# Patient Record
Sex: FEMALE | Race: WHITE | Employment: FULL TIME | URBAN - METROPOLITAN AREA
[De-identification: names, ages, dates, MRNs, and addresses within clinical notes are randomized per-mention and may not be internally consistent; named-entity substitution may affect disease eponyms.]

---

## 2017-02-03 ENCOUNTER — OFFICE VISIT (OUTPATIENT)
Dept: INTERNAL MEDICINE CLINIC | Age: 57
End: 2017-02-03

## 2017-02-03 VITALS
TEMPERATURE: 98.2 F | BODY MASS INDEX: 41.07 KG/M2 | WEIGHT: 209.2 LBS | DIASTOLIC BLOOD PRESSURE: 77 MMHG | HEART RATE: 74 BPM | RESPIRATION RATE: 16 BRPM | HEIGHT: 60 IN | SYSTOLIC BLOOD PRESSURE: 118 MMHG

## 2017-02-03 DIAGNOSIS — J45.909 UNCOMPLICATED ASTHMA, UNSPECIFIED ASTHMA SEVERITY: ICD-10-CM

## 2017-02-03 DIAGNOSIS — Z83.3 FAMILY HISTORY OF DIABETES MELLITUS: ICD-10-CM

## 2017-02-03 DIAGNOSIS — E55.9 VITAMIN D DEFICIENCY: ICD-10-CM

## 2017-02-03 DIAGNOSIS — J30.9 ALLERGIC RHINITIS, UNSPECIFIED ALLERGIC RHINITIS TRIGGER, UNSPECIFIED RHINITIS SEASONALITY: ICD-10-CM

## 2017-02-03 DIAGNOSIS — Z00.00 WELL WOMAN EXAM (NO GYNECOLOGICAL EXAM): Primary | ICD-10-CM

## 2017-02-03 RX ORDER — FLUTICASONE PROPIONATE 50 MCG
2 SPRAY, SUSPENSION (ML) NASAL DAILY
COMMUNITY
End: 2018-03-30 | Stop reason: SDUPTHER

## 2017-02-03 NOTE — PROGRESS NOTES
RM 13  Pt has been fasting today    Chief Complaint   Patient presents with    Complete Physical       1. Have you been to the ER, urgent care clinic since your last visit? Hospitalized since your last visit? No    2. Have you seen or consulted any other health care providers outside of the [address] [place] [address] since your last visit? Include any pap smears or colon screening.  No    Health Maintenance Due   Topic Date Due    Pneumococcal 19-64 Medium Risk (1 of 1 - PPSV23) 10/04/1979    INFLUENZA AGE 9 TO ADULT  08/01/2016   Pt had flu vaccine at [facility] 10/16

## 2017-02-03 NOTE — PATIENT INSTRUCTIONS
Learning About Living [place]  What is a living will? A living will is a legal form you use to write down the kind of care you want at the end of your life. It is used by the health professionals who will treat you if you aren't able to decide for yourself. If you put your wishes in writing, your loved ones and others will know what kind of care you want. They won't need to guess. This can ease your mind and be helpful to others. A living will is not the same as an estate or property will. An estate will explains what you want to happen with your money and property after you die. Is a living will a legal document? A living will is a legal document. Each state has its own laws about living [place]ott. If you move to another state, make sure that your living will is legal in the state where you now live. Or you might use a universal form that has been approved by many states. This kind of form can sometimes be completed and stored online. Your electronic copy will then be available wherever you have a connection to the Internet. In most cases, doctors will respect your wishes even if you have a form from a different state. · You don't need an  to complete a living will. But legal advice can be helpful if your state's laws are unclear, your health history is complicated, or your family can't agree on what should be in your living will. · You can change your living will at any time. Some people find that their wishes about end-of-life care change as their health changes. · In addition to making a living will, think about completing a medical power of  form. This form lets you name the person you want to make end-of-life treatment decisions for you (your \"health care agent\") if you're not able to. Many hospitals and nursing homes will give you the forms you need to complete a living will and a medical power of .   · Your living will is used only if you can't make or communicate decisions for yourself anymore. If you become able to make decisions again, you can accept or refuse any treatment, no matter what you wrote in your living will. · Your state may offer an online registry. This is a place where you can store your living will online so the doctors and nurses who need to treat you can find it right away. What should you think about when creating a living will? Talk about your end-of-life wishes with your family members and your doctor. Let them know what you want. That way the people making decisions for you won't be surprised by your choices. Think about these questions as you make your living will:  · Do you know enough about life support methods that might be used? If not, talk to your doctor so you know what might be done if you can't breathe on your own, your heart stops, or you're unable to swallow. · What things would you still want to be able to do after you receive life-support methods? Would you want to be able to walk? To speak? To eat on your own? To live without the help of machines? · If you have a choice, where do you want to be cared for? In your home? At a hospital or nursing home? · Do you want certain [employer]ar[employer] practices performed if you become very ill? · If you have a choice at the end of your life, where would you prefer to die? At home? In a hospital or nursing home? Somewhere else? · Would you prefer to be buried or cremated? · Do you want your organs to be donated after you die? What should you do with your living will? · Make sure that your family members and your health care agent have copies of your living will. · Give your doctor a copy of your living will to keep in your medical record. If you have more than one doctor, make sure that each one has a copy. · You may want to put a copy of your living will where it can be easily found. Where can you learn more? Go to http://abdoul-lobito.info/.   Enter B455 in the search box to learn more about \"Learning About Living [place]. \"  Current as of: February 24, 2016  Content Version: 11.1  © 2451-1832 [employer]. Care instructions adapted under license by [employer] (which disclaims liability or warranty for this information). If you have questions about a medical condition or this instruction, always ask your healthcare professional. [address] any warranty or liability for your use of this information.

## 2017-02-03 NOTE — MR AVS SNAPSHOT
Visit Information Date & Time Provider Department Dept. Phone Encounter #  
 2/3/2017  9:00 AM Lisa Mo, [address] [place] [address] and Internal Medicine [phone] [id number] Follow-up Instructions Return in about 1 year (around 2/3/2018), or if symptoms worsen or fail to improve, for wellness visit, asthma. Upcoming Health Maintenance Date Due  
 BREAST CANCER SCRN MAMMOGRAM 9/1/2017 PAP AKA CERVICAL CYTOLOGY 9/1/2018 COLONOSCOPY 10/16/2020 DTaP/Tdap/Td series (2 - Td) 10/8/2024 Allergies as of 2/3/2017  Review Complete On: 2/3/2017 By: Lisa Mo MD  
  
 Severity Noted Reaction Type Reactions Pcn [Penicillins]  10/08/2014    Rash, Itching Current Immunizations  Reviewed on 2/3/2017 Name Date Influenza Vaccine 10/16/2016, 10/11/2014 Influenza Vaccine (Quad) PF 11/6/2015 Pneumococcal Polysaccharide (PPSV-23) 1/1/2012 Tdap 10/8/2014 Zoster Vaccine, Live 10/11/2014 Reviewed by Lisa Mo MD on 2/3/2017 at  9:50 AM  
You Were Diagnosed With   
  
 Codes Comments Uncomplicated asthma, unspecified asthma severity    -  Primary ICD-10-CM: J45.909 ICD-9-CM: 493.90 Allergic rhinitis, unspecified allergic rhinitis trigger, unspecified rhinitis seasonality     ICD-10-CM: J30.9 ICD-9-CM: 477.9 Vitamin D deficiency     ICD-10-CM: E55.9 ICD-9-CM: 268.9 Well woman exam (no gynecological exam)     ICD-10-CM: Z00.00 ICD-9-CM: V70.0 [V70.0] Family history of diabetes mellitus     ICD-10-CM: Z83.3 ICD-9-CM: V18.0 Vitals BP Pulse Temp Resp Height(growth percentile) Weight(growth percentile) 118/77 74 98.2 °F (36.8 °C) (Oral) 16 5' (1.524 m) 209 lb 3.2 oz (94.9 kg) BMI OB Status Smoking Status 40.86 kg/m2 Menopause Never Smoker BMI and BSA Data Body Mass Index Body Surface Area  
 40.86 kg/m 2 2 m 2 Preferred Pharmacy Pharmacy Name Phone [id number] [place]/ [facility] [phone] Your Updated Medication List  
  
   
This list is accurate as of: 2/3/17 10:10 AM.  Always use your most recent med list.  
  
  
  
  
 Calcium-Cholecalciferol (D3) 600 mg(1,500mg) -400 unit Cap Take  by mouth. cetirizine 5 mg tablet Commonly known as:  ZYRTEC Take  by mouth daily. diclofenac 1 % Gel Commonly known as:  VOLTAREN Apply 4 g to affected area four (4) times daily. FLONASE 50 mcg/actuation nasal spray Generic drug:  fluticasone 2 Sprays by Both Nostrils route daily. inhalational spacing device Commonly known as:  MICROCHAMBER  
1 each by Does Not Apply route as needed. montelukast 10 mg tablet Commonly known as:  SINGULAIR  
TAKE ONE TABLET BY MOUTH EVERY DAY  
  
 PROAIR HFA 90 mcg/actuation inhaler Generic drug:  albuterol INHALE 2 PUFFS EVERY 4 HOUR S AS NEEDED F OR WHEEZING  
  
 VITAMIN C 500 mg tablet Generic drug:  ascorbic acid (vitamin C) Take  by mouth. ZINC ACETATE PO Take  by mouth. We Performed the Following CBC WITH AUTOMATED DIFF [58065 CPT(R)] HEMOGLOBIN A1C WITH EAG [11903 CPT(R)] LIPID PANEL [79299 CPT(R)] METABOLIC PANEL, COMPREHENSIVE [55303 CPT(R)] VITAMIN D, 25 HYDROXY [id number] CPT(R)] Follow-up Instructions Return in about 1 year (around 2/3/2018), or if symptoms worsen or fail to improve, for wellness visit, asthma. Patient Instructions Autumn Henao 17[address] What is a living will? A living will is a legal form you use to write down the kind of care you want at the end of your life. It is used by the health professionals who will treat you if you aren't able to decide for yourself. If you put your wishes in writing, your loved ones and others will know what kind of care you want. They won't need to guess.  This can ease your mind and be helpful to others. A living will is not the same as an estate or property will. An estate will explains what you want to happen with your money and property after you die. Is a living will a legal document? A living will is a legal document. Each state has its own laws about living [place]. If you move to another state, make sure that your living will is legal in the state where you now live. Or you might use a universal form that has been approved by many states. This kind of form can sometimes be completed and stored online. Your electronic copy will then be available wherever you have a connection to the Internet. In most cases, doctors will respect your wishes even if you have a form from a different state. · You don't need an  to complete a living will. But legal advice can be helpful if your state's laws are unclear, your health history is complicated, or your family can't agree on what should be in your living will. · You can change your living will at any time. Some people find that their wishes about end-of-life care change as their health changes. · In addition to making a living will, think about completing a medical power of  form. This form lets you name the person you want to make end-of-life treatment decisions for you (your \"health care agent\") if you're not able to. Many hospitals and nursing homes will give you the forms you need to complete a living will and a medical power of . · Your living will is used only if you can't make or communicate decisions for yourself anymore. If you become able to make decisions again, you can accept or refuse any treatment, no matter what you wrote in your living will. · Your state may offer an online registry. This is a place where you can store your living will online so the doctors and nurses who need to treat you can find it right away. What should you think about when creating a living will? Talk about your end-of-life wishes with your family members and your doctor. Let them know what you want. That way the people making decisions for you won't be surprised by your choices. Think about these questions as you make your living will: · Do you know enough about life support methods that might be used? If not, talk to your doctor so you know what might be done if you can't breathe on your own, your heart stops, or you're unable to swallow. · What things would you still want to be able to do after you receive life-support methods? Would you want to be able to walk? To speak? To eat on your own? To live without the help of machines? · If you have a choice, where do you want to be cared for? In your home? At a hospital or nursing home? · Do you want certain Christianity practices performed if you become very ill? · If you have a choice at the end of your life, where would you prefer to die? At home? In a hospital or nursing home? Somewhere else? · Would you prefer to be buried or cremated? · Do you want your organs to be donated after you die? What should you do with your living will? · Make sure that your family members and your health care agent have copies of your living will. · Give your doctor a copy of your living will to keep in your medical record. If you have more than one doctor, make sure that each one has a copy. · You may want to put a copy of your living will where it can be easily found. Where can you learn more? Go to http://abdoul-lobito.info/. Enter F216 in the search box to learn more about \"Learning About Living [place]. \" Current as of: February 24, 2016 Content Version: 11.1 © 6805-0066 [employer]. Care instructions adapted under license by [employer] (which disclaims liability or warranty for this information).  If you have questions about a medical condition or this instruction, always ask your healthcare professional. Lenny Ramírez[employer] Incorporated disclaims any warranty or liability for your use of this information. Introducing [facility]! Dear Reg Cabrera: Thank you for requesting a Poshly account. Our records indicate that you already have an active Poshly account. You can access your account anytime at [email or web address] [email or web address] Did you know that you can access your hospital and ER discharge instructions at any time in Poshly? You can also review all of your test results from your hospital stay or ER visit. Additional Information If you have questions, please visit the Frequently Asked Questions section of the Poshly website at [email or web address] [email or web address]/. Remember, Poshly is NOT to be used for urgent needs. For medical emergencies, dial 911. Now available from your iPhone and Android! Please provide this summary of care documentation to your next provider. Your primary care clinician is listed as [address] If you have any questions after today's visit, please call [phone].

## 2017-02-03 NOTE — PROGRESS NOTES
Subjective:   64 y.o. female for Well Woman Check. Her gyne and breast care is done elsewhere by her Ob-Gyne physician. Past medical, Social, and Family history reviewed  Medications reviewed and updated. ROS: Feeling generally well. No TIA's or unusual headaches, no dysphagia. No prolonged cough. No dyspnea or chest pain on exertion. No abdominal pain, change in bowel habits, black or bloody stools. No urinary tract symptoms. No new or unusual musculoskeletal symptoms. Specific concerns today:   Albuterol a couple of times per month  Triggered by specific environmental triggers. Really cold air triggered symptoms as well    Actually pt does well exercising at the gym. Pt asks re: PCN allergy     Objective: The patient appears well, alert, oriented x 3, in no distress. Visit Vitals    /77    Pulse 74    Temp 98.2 °F (36.8 °C) (Oral)    Resp 16    Ht 5' (1.524 m)  Comment: as per chart    Wt 209 lb 3.2 oz (94.9 kg)    BMI 40.86 kg/m2     ENT normal.  Neck supple. No adenopathy or thyromegaly. DIEGO. Lungs are clear, good air entry, no wheezes, rhonchi or rales. S1 and S2 normal, no murmurs, regular rate and rhythm. Abdomen soft without tenderness, guarding, mass or organomegaly. Extremities show no edema, normal peripheral pulses. Neurological is normal, no focal findings. Breast and Pelvic exams are deferred. Prior labs reviewed. Reviewed PFTs again      Assessment/Plan:   Well Woman  follow low fat diet, routine labs ordered, call if any problems    ICD-10-CM ICD-9-CM    1. Well woman exam (no gynecological exam) Z00.00 V70.0 CBC WITH AUTOMATED DIFF      HEMOGLOBIN A1C WITH EAG      LIPID PANEL      METABOLIC PANEL, COMPREHENSIVE    [V70.0]   2. Uncomplicated asthma, unspecified asthma severity J45.909 493.90    3. Allergic rhinitis, unspecified allergic rhinitis trigger, unspecified rhinitis seasonality J30.9 477.9    4.  Vitamin D deficiency E55.9 268.9 VITAMIN D, 25 HYDROXY   5. Family history of diabetes mellitus Z83.3 V18.0 HEMOGLOBIN A1C WITH EAG     Follow-up Disposition:  Return in about 1 year (around 2/3/2018), or if symptoms worsen or fail to improve, for wellness visit, asthma.    results and schedule of future studies reviewed with patient  reviewed diet, exercise and weight   cardiovascular risk and specific lipid/LDL goals reviewed  reviewed medications and side effects in detail   Discussed ICS/LABA - deferred for now  Continue singulair  Get mammo and PAP records from [address] [place] [address]  Pt to consider allergy testing

## 2017-02-04 LAB
25(OH)D3+25(OH)D2 SERPL-MCNC: 29.7 NG/ML (ref 30–100)
ALBUMIN SERPL-MCNC: 4.3 G/DL (ref 3.5–5.5)
ALBUMIN/GLOB SERPL: 1.5 {RATIO} (ref 1.1–2.5)
ALP SERPL-CCNC: 86 IU/L (ref 39–117)
ALT SERPL-CCNC: 56 IU/L (ref 0–32)
AST SERPL-CCNC: 38 IU/L (ref 0–40)
BASOPHILS # BLD AUTO: 0 X10E3/UL (ref 0–0.2)
BASOPHILS NFR BLD AUTO: 0 %
BILIRUB SERPL-MCNC: 0.3 MG/DL (ref 0–1.2)
BUN SERPL-MCNC: 19 MG/DL (ref 6–24)
BUN/CREAT SERPL: 25 (ref 9–23)
CALCIUM SERPL-MCNC: 9.6 MG/DL (ref 8.7–10.2)
CHLORIDE SERPL-SCNC: 101 MMOL/L (ref 96–106)
CHOLEST SERPL-MCNC: 210 MG/DL (ref 100–199)
CO2 SERPL-SCNC: 24 MMOL/L (ref 18–29)
CREAT SERPL-MCNC: 0.76 MG/DL (ref 0.57–1)
EOSINOPHIL # BLD AUTO: 0.3 X10E3/UL (ref 0–0.4)
EOSINOPHIL NFR BLD AUTO: 5 %
ERYTHROCYTE [DISTWIDTH] IN BLOOD BY AUTOMATED COUNT: 12.6 % (ref 12.3–15.4)
EST. AVERAGE GLUCOSE BLD GHB EST-MCNC: 111 MG/DL
GLOBULIN SER CALC-MCNC: 2.9 G/DL (ref 1.5–4.5)
GLUCOSE SERPL-MCNC: 89 MG/DL (ref 65–99)
HBA1C MFR BLD: 5.5 % (ref 4.8–5.6)
HCT VFR BLD AUTO: 45.1 % (ref 34–46.6)
HDLC SERPL-MCNC: 85 MG/DL
HGB BLD-MCNC: 15.3 G/DL (ref 11.1–15.9)
IMM GRANULOCYTES # BLD: 0 X10E3/UL (ref 0–0.1)
IMM GRANULOCYTES NFR BLD: 0 %
LDLC SERPL CALC-MCNC: 114 MG/DL (ref 0–99)
LYMPHOCYTES # BLD AUTO: 1.8 X10E3/UL (ref 0.7–3.1)
LYMPHOCYTES NFR BLD AUTO: 31 %
MCH RBC QN AUTO: 32.1 PG (ref 26.6–33)
MCHC RBC AUTO-ENTMCNC: 33.9 G/DL (ref 31.5–35.7)
MCV RBC AUTO: 95 FL (ref 79–97)
MONOCYTES # BLD AUTO: 0.6 X10E3/UL (ref 0.1–0.9)
MONOCYTES NFR BLD AUTO: 10 %
NEUTROPHILS # BLD AUTO: 3.1 X10E3/UL (ref 1.4–7)
NEUTROPHILS NFR BLD AUTO: 54 %
PLATELET # BLD AUTO: 248 X10E3/UL (ref 150–379)
POTASSIUM SERPL-SCNC: 4.1 MMOL/L (ref 3.5–5.2)
PROT SERPL-MCNC: 7.2 G/DL (ref 6–8.5)
RBC # BLD AUTO: 4.77 X10E6/UL (ref 3.77–5.28)
SODIUM SERPL-SCNC: 142 MMOL/L (ref 134–144)
TRIGL SERPL-MCNC: 57 MG/DL (ref 0–149)
VLDLC SERPL CALC-MCNC: 11 MG/DL (ref 5–40)
WBC # BLD AUTO: 5.8 X10E3/UL (ref 3.4–10.8)

## 2018-03-30 ENCOUNTER — OFFICE VISIT (OUTPATIENT)
Dept: INTERNAL MEDICINE CLINIC | Age: 58
End: 2018-03-30

## 2018-03-30 VITALS
WEIGHT: 207.2 LBS | RESPIRATION RATE: 16 BRPM | DIASTOLIC BLOOD PRESSURE: 76 MMHG | TEMPERATURE: 98 F | BODY MASS INDEX: 40.68 KG/M2 | SYSTOLIC BLOOD PRESSURE: 126 MMHG | HEIGHT: 60 IN | OXYGEN SATURATION: 95 % | HEART RATE: 75 BPM

## 2018-03-30 DIAGNOSIS — Z00.00 WELL WOMAN EXAM (NO GYNECOLOGICAL EXAM): Primary | ICD-10-CM

## 2018-03-30 DIAGNOSIS — J45.909 UNCOMPLICATED ASTHMA, UNSPECIFIED ASTHMA SEVERITY, UNSPECIFIED WHETHER PERSISTENT: ICD-10-CM

## 2018-03-30 DIAGNOSIS — J30.9 ALLERGIC RHINITIS, UNSPECIFIED CHRONICITY, UNSPECIFIED SEASONALITY, UNSPECIFIED TRIGGER: ICD-10-CM

## 2018-03-30 DIAGNOSIS — E55.9 VITAMIN D DEFICIENCY: ICD-10-CM

## 2018-03-30 PROBLEM — E66.01 OBESITY, MORBID (HCC): Status: ACTIVE | Noted: 2018-03-30

## 2018-03-30 RX ORDER — FLUTICASONE PROPIONATE 50 MCG
2 SPRAY, SUSPENSION (ML) NASAL DAILY
Qty: 1 BOTTLE | Refills: 5 | Status: SHIPPED | OUTPATIENT
Start: 2018-03-30

## 2018-03-30 RX ORDER — ALBUTEROL SULFATE 90 UG/1
AEROSOL, METERED RESPIRATORY (INHALATION)
Qty: 1 INHALER | Refills: 2 | Status: SHIPPED | OUTPATIENT
Start: 2018-03-30 | End: 2019-06-01 | Stop reason: SDUPTHER

## 2018-03-30 RX ORDER — MONTELUKAST SODIUM 10 MG/1
10 TABLET ORAL DAILY
Qty: 90 TAB | Refills: 3 | Status: SHIPPED | OUTPATIENT
Start: 2018-03-30 | End: 2019-05-29 | Stop reason: SDUPTHER

## 2018-03-30 NOTE — PROGRESS NOTES
Subjective:   62 y.o. female for Well Woman Check. Her gyne and breast care is done elsewhere by her Ob-Gyne physician. Past medical, Social, and Family history reviewed  Medications reviewed and updated. ROS: Feeling generally well. No TIA's or unusual headaches, no dysphagia. No prolonged cough. No dyspnea or chest pain on exertion. No abdominal pain, change in bowel habits, black or bloody stools. No urinary tract symptoms. No new or unusual musculoskeletal symptoms. Specific concerns today:   Reviewed labs    Objective: The patient appears well, alert, oriented x 3, in no distress. Visit Vitals    /76 (BP 1 Location: Left arm, BP Patient Position: Sitting)    Pulse 75    Temp 98 °F (36.7 °C) (Oral)    Resp 16    Ht 5' (1.524 m)  Comment: per chart    Wt 207 lb 3.2 oz (94 kg)    SpO2 95%    BMI 40.47 kg/m2     ENT normal.  Neck supple. No adenopathy or thyromegaly. DIEGO. Lungs are clear, good air entry, no wheezes, rhonchi or rales. S1 and S2 normal, no murmurs, regular rate and rhythm. Abdomen soft without tenderness, guarding, mass or organomegaly. Extremities show no edema, normal peripheral pulses. Neurological is normal, no focal findings. Breast and Pelvic exams are deferred. Prior labs reviewed. Assessment/Plan:   Well Woman  follow low fat diet, routine labs ordered, call if any problems    ICD-10-CM ICD-9-CM    1. Well woman exam (no gynecological exam) Z00.00 V70.0     [V70.0]   2. Uncomplicated asthma, unspecified asthma severity, unspecified whether persistent J45.909 493.90    3. Allergic rhinitis, unspecified chronicity, unspecified seasonality, unspecified trigger J30.9 477.9    4. Vitamin D deficiency E55.9 268.9      Follow-up Disposition:  Return in about 1 year (around 3/30/2019), or if symptoms worsen or fail to improve, for wellness visit.     results and schedule of future studies reviewed with patient  reviewed diet, exercise and weight cardiovascular risk and specific lipid/LDL goals reviewed  reviewed medications and side effects in detail

## 2018-03-30 NOTE — PROGRESS NOTES
Rm 14    Chief Complaint   Patient presents with    Complete Physical   pt would like to discuss lab results    1. Have you been to the ER, urgent care clinic since your last visit? Hospitalized since your last visit? No    2. Have you seen or consulted any other health care providers outside of the [facility] since your last visit? Include any pap smears or colon screening.  No    Health Maintenance Due   Topic Date Due    Influenza Age 5 to Adult  08/01/2017    BREAST CANCER SCRN MAMMOGRAM  09/01/2017   flu vaccine done 10/2018 per pt  mammogram 2/2018 per pt    PHQ over the last two weeks 3/30/2018   Little interest or pleasure in doing things Not at all   Feeling down, depressed or hopeless Not at all   Total Score PHQ 2 0     Learning Assessment 3/30/2018   PRIMARY LEARNER Patient   HIGHEST LEVEL OF EDUCATION - PRIMARY LEARNER  4 YEARS OF COLLEGE   BARRIERS PRIMARY LEARNER NONE     -   [address]  CAREGIVER No   PRIMARY LANGUAGE ENGLISH   LEARNER PREFERENCE PRIMARY LISTENING     READING   ANSWERED BY patient   RELATIONSHIP SELF

## 2018-03-30 NOTE — MR AVS SNAPSHOT
[address] [place] [address] Mary Baer [address] 
[phone] Patient: Ant Nair 
MRN: [id number] :1960 Visit Information Date & Time Provider Department Dept. Phone Encounter #  
 3/30/2018  9:00 AM Misael Escobar, [address] [place] [address] and Internal Medicine [phone] [id number] Follow-up Instructions Return in about 1 year (around 3/30/2019), or if symptoms worsen or fail to improve, for wellness visit. Upcoming Health Maintenance Date Due  
 PAP AKA CERVICAL CYTOLOGY 2020 BREAST CANCER SCRN MAMMOGRAM 2020 COLONOSCOPY 10/16/2020 DTaP/Tdap/Td series (2 - Td) 10/8/2024 Allergies as of 3/30/2018  Review Complete On: 3/30/2018 By: Misael Escobar MD  
  
 Severity Noted Reaction Type Reactions Pcn [Penicillins]  10/08/2014    Rash, Itching Current Immunizations  Reviewed on 3/30/2018 Name Date Influenza Vaccine 10/18/2017, 10/16/2016, 10/11/2014 Influenza Vaccine (Quad) PF 2015 Pneumococcal Polysaccharide (PPSV-23) 2012 Tdap 10/8/2014 Zoster Vaccine, Live 10/11/2014 Reviewed by Misael Escobar MD on 3/30/2018 at 10:21 AM  
You Were Diagnosed With   
  
 Codes Comments Well woman exam (no gynecological exam)    -  Primary ICD-10-CM: Z00.00 ICD-9-CM: V70.0 [V70.0] Uncomplicated asthma, unspecified asthma severity, unspecified whether persistent     ICD-10-CM: J45.909 ICD-9-CM: 493.90 Allergic rhinitis, unspecified chronicity, unspecified seasonality, unspecified trigger     ICD-10-CM: J30.9 ICD-9-CM: 477.9 Vitamin D deficiency     ICD-10-CM: E55.9 ICD-9-CM: 268.9 Vitals BP Pulse Temp Resp Height(growth percentile) Weight(growth percentile) 126/76 (BP 1 Location: Left arm, BP Patient Position: Sitting) 75 98 °F (36.7 °C) (Oral) 16 5' (1.524 m) 207 lb 3.2 oz (94 kg) SpO2 BMI OB Status Smoking Status 95% 40.47 kg/m2 Menopause Never Smoker Vitals History BMI and BSA Data Body Mass Index Body Surface Area 40.47 kg/m 2 1.99 m 2 Preferred Pharmacy Pharm[facility] Name Phone [phone] Your Updated Medication List  
  
   
This list is accurate as of 3/30/18 10:36 AM.  Always use your most recent med list.  
  
  
  
  
 albuterol 90 mcg/actuation inhaler Commonly known as:  PROAIR HFA INHALE 2 PUFFS EVERY 4 HOUR S AS NEEDED F OR WHEEZING Calcium-Cholecalciferol (D3) 600 mg(1,500mg) -400 unit Cap Take  by mouth. cetirizine 5 mg tablet Commonly known as:  ZYRTEC Take  by mouth daily. diclofenac 1 % Gel Commonly known as:  VOLTAREN Apply 4 g to affected area four (4) times daily. fluticasone 50 mcg/actuation nasal spray Commonly known as:  Beverlyn Maker 2 Sprays by Both Nostrils route daily. inhalational spacing device Commonly known as:  MICROCHAMBER  
1 each by Does Not Apply route as needed. montelukast 10 mg tablet Commonly known as:  SINGULAIR Take 1 Tab by mouth daily. MULTIVITAMIN PO Take  by mouth. PROBIOTIC PO Take  by mouth. VITAMIN B-12 PO Take  by mouth. VITAMIN C 500 mg tablet Generic drug:  ascorbic acid (vitamin C) Take  by mouth. ZINC ACETATE PO Take  by mouth. Prescriptions Sent to Pharmacy Refills  
 montelukast (SINGULAIR) 10 mg tablet 3 Sig: Take 1 Tab by mouth daily. Class: Normal  
 Pharmacy: [id number] at [address] [place] [address] Ph #: [phone] Route: Oral  
 fluticasone (FLONASE) 50 mcg/actuation nasal spray 5 Si Sprays by Both Nostrils route daily. Class: Normal  
 Pharmacy: [id number] at [address] [place] [address] Ph #: [phone] Route: Both Nostrils albuterol (PROAIR HFA) 90 mcg/actuation inhaler 2 Sig: INHALE 2 PUFFS EVERY 4 HOUR S AS NEEDED F OR WHEEZING Class: Normal  
 Pharmacy: [id number] at [address] [place] [address] #: [phone] Follow-up Instructions Return in about 1 year (around 3/30/2019), or if symptoms worsen or fail to improve, for wellness visit. Introducing [facility]! Dear Caterina Alvarado: Thank you for requesting a [employer] account. Our records indicate that you already have an active [employer] account. You can access your account anytime at [email or web address] Did you know that you can access your hospital and ER discharge instructions at any time in [employer]? You can also review all of your test results from your hospital stay or ER visit. Additional Information If you have questions, please visit the Frequently Asked Questions section of the [employer] website at [email or web address] Remember, [employer] is NOT to be used for urgent needs. For medical emergencies, dial 911. Now available from your iPhone and Android! Please provide this summary of care documentation to your next provider. Your primary care clinician is listed as [id number] If you have any questions after today's visit, please call [phone].

## 2019-02-12 ENCOUNTER — OFFICE VISIT (OUTPATIENT)
Dept: INTERNAL MEDICINE CLINIC | Age: 59
End: 2019-02-12

## 2019-02-12 VITALS
BODY MASS INDEX: 39.66 KG/M2 | DIASTOLIC BLOOD PRESSURE: 66 MMHG | RESPIRATION RATE: 12 BRPM | SYSTOLIC BLOOD PRESSURE: 102 MMHG | OXYGEN SATURATION: 95 % | WEIGHT: 202 LBS | HEIGHT: 60 IN | HEART RATE: 72 BPM | TEMPERATURE: 98.7 F

## 2019-02-12 DIAGNOSIS — J30.9 ALLERGIC RHINITIS, UNSPECIFIED SEASONALITY, UNSPECIFIED TRIGGER: ICD-10-CM

## 2019-02-12 DIAGNOSIS — J45.909 UNCOMPLICATED ASTHMA, UNSPECIFIED ASTHMA SEVERITY, UNSPECIFIED WHETHER PERSISTENT: ICD-10-CM

## 2019-02-12 DIAGNOSIS — Z00.00 WELL WOMAN EXAM (NO GYNECOLOGICAL EXAM): Primary | ICD-10-CM

## 2019-02-12 DIAGNOSIS — E55.9 VITAMIN D DEFICIENCY: ICD-10-CM

## 2019-02-12 RX ORDER — AZELASTINE 1 MG/ML
1 SPRAY, METERED NASAL 2 TIMES DAILY
Qty: 1 BOTTLE | Refills: 5 | Status: SHIPPED | OUTPATIENT
Start: 2019-02-12 | End: 2020-09-08

## 2019-02-12 NOTE — PROGRESS NOTES
Subjective:   62 y.o. female for Well Woman Check. Her gyne and breast care is done elsewhere by her Ob-Gyne physician. Past medical, Social, and Family history reviewed  Medications reviewed and updated. ROS: Feeling generally well. No TIA's or unusual headaches, no dysphagia. No prolonged cough. No dyspnea or chest pain on exertion. No abdominal pain, change in bowel habits, black or bloody stools. No urinary tract symptoms. No new or unusual musculoskeletal symptoms. Specific concerns today:   Pt had PAP and mammo    flonase caused nose bleeds and mucosal thinning  Using singulair    Perfumes are triggering asthma, and upper resp sx  Episodes of chest tightness x 2   Used albuterol x several doses during those times. Objective: The patient appears well, alert, oriented x 3, in no distress. Visit Vitals  /66   Pulse 72   Temp 98.7 °F (37.1 °C) (Oral)   Resp 12   Ht 5' (1.524 m)   Wt 202 lb (91.6 kg)   SpO2 95%   BMI 39.45 kg/m²     ENT normal.  Neck supple. No adenopathy or thyromegaly. DIEGO. Lungs are clear, good air entry, no wheezes, rhonchi or rales. S1 and S2 normal, no murmurs, regular rate and rhythm. Abdomen soft without tenderness, guarding, mass or organomegaly. Extremities show no edema, normal peripheral pulses. Neurological is normal, no focal findings. Breast and Pelvic exams are deferred. Prior labs reviewed. Assessment/Plan:   Well Woman  follow low fat diet, routine labs ordered, call if any problems    ICD-10-CM ICD-9-CM    1. Well woman exam (no gynecological exam) Z00.00 V70.0     [V70.0]   2. Vitamin D deficiency E55.9 268.9    3. Uncomplicated asthma, unspecified asthma severity, unspecified whether persistent J45.909 493.90    4.  Allergic rhinitis, unspecified seasonality, unspecified trigger J30.9 477.9 azelastine (ASTELIN) 137 mcg (0.1 %) nasal spray     Follow-up Disposition:  Return in about 1 year (around 2/12/2020), or if symptoms worsen or fail to improve, for wellness visit.    results and schedule of future studies reviewed with patient  reviewed diet, exercise and weight   cardiovascular risk and specific lipid/LDL goals reviewed  reviewed medications and side effects in detail   Add astelin  Consider seasonal flonase + nasal saline and/or gel  Monitor asthma sx

## 2019-02-12 NOTE — PROGRESS NOTES
Room 13    Chief Complaint   Patient presents with    Complete Physical    Other     Patient stopped flonase due to drying out nasal passages and causing bleeding. Any other options? 1. Have you been to the ER, urgent care clinic since your last visit? Hospitalized since your last visit? No    2. Have you seen or consulted any other health care providers outside of the [address] [place] [address] since your last visit? Include any pap smears or colon screening. No    Health Maintenance Due   Topic Date Due    Shingrix Vaccine Age 49> (1 of 2) 10/04/2010     Patient had flu vaccine. 3 most recent PHQ Screens 2/12/2019   Little interest or pleasure in doing things Not at all   Feeling down, depressed, irritable, or hopeless Not at all   Total Score PHQ 2 0       Abuse Screening Questionnaire 2/12/2019   Do you ever feel afraid of your partner? N   Are you in a relationship with someone who physically or mentally threatens you? N   Is it safe for you to go home?  Y         Learning Assessment 3/30/2018   PRIMARY LEARNER Patient   HIGHEST LEVEL OF EDUCATION - PRIMARY LEARNER  4 YEARS OF COLLEGE   BARRIERS PRIMARY LEARNER NONE     -   CO-LEARNER CAREGIVER No   PRIMARY LANGUAGE ENGLISH   LEARNER PREFERENCE PRIMARY LISTENING     READING   ANSWERED BY patient   RELATIONSHIP SELF

## 2019-05-29 RX ORDER — MONTELUKAST SODIUM 10 MG/1
TABLET ORAL
Qty: 90 TAB | Refills: 3 | Status: SHIPPED | OUTPATIENT
Start: 2019-05-29 | End: 2020-05-24

## 2019-06-02 RX ORDER — ALBUTEROL SULFATE 90 UG/1
AEROSOL, METERED RESPIRATORY (INHALATION)
Qty: 8.5 INHALER | Refills: 2 | Status: SHIPPED | OUTPATIENT
Start: 2019-06-02 | End: 2020-07-07 | Stop reason: SDUPTHER

## 2020-05-24 RX ORDER — MONTELUKAST SODIUM 10 MG/1
TABLET ORAL
Qty: 90 TAB | Refills: 1 | Status: SHIPPED | OUTPATIENT
Start: 2020-05-24 | End: 2020-10-09 | Stop reason: SDUPTHER

## 2020-06-17 ENCOUNTER — TELEPHONE (OUTPATIENT)
Dept: INTERNAL MEDICINE CLINIC | Age: 60
End: 2020-06-17

## 2020-06-17 DIAGNOSIS — Z20.822 CLOSE EXPOSURE TO COVID-19 VIRUS: Primary | ICD-10-CM

## 2020-06-17 NOTE — TELEPHONE ENCOUNTER
Agree to order Ab testing for COVID 19    I can discuss with pt the limitations of this test at the time of her visit.

## 2020-06-17 NOTE — TELEPHONE ENCOUNTER
Faxed both fasting labs and SARS testing orders to [address] # [phone]  Informed pt of providers message.    Pt verbalized understanding

## 2020-06-17 NOTE — TELEPHONE ENCOUNTER
Pt thinks her daughter, who lives w/ her, may have had covid23 in Feb 2020, (daughter had sob, high fever, cough, tested negative for flu). Pt, David Older, wants to get antibody test since she thinks she may been exposed. Pt going to [facility] to get her other labs done prior to 7/7/20 OV and would like to get antibody test done at the same time as her other labs.    Please call pt - # [phone]

## 2020-06-26 LAB — SARS-COV-2 AB, IGG, CORG1M: NEGATIVE

## 2020-07-07 ENCOUNTER — TELEPHONE (OUTPATIENT)
Dept: INTERNAL MEDICINE CLINIC | Age: 60
End: 2020-07-07

## 2020-07-07 ENCOUNTER — OFFICE VISIT (OUTPATIENT)
Dept: INTERNAL MEDICINE CLINIC | Age: 60
End: 2020-07-07

## 2020-07-07 VITALS
OXYGEN SATURATION: 96 % | HEIGHT: 60 IN | SYSTOLIC BLOOD PRESSURE: 116 MMHG | WEIGHT: 209.38 LBS | HEART RATE: 73 BPM | DIASTOLIC BLOOD PRESSURE: 77 MMHG | TEMPERATURE: 98.6 F | RESPIRATION RATE: 16 BRPM | BODY MASS INDEX: 41.11 KG/M2

## 2020-07-07 DIAGNOSIS — Z12.11 ENCOUNTER FOR SCREENING COLONOSCOPY: ICD-10-CM

## 2020-07-07 DIAGNOSIS — J30.9 ALLERGIC RHINITIS, UNSPECIFIED SEASONALITY, UNSPECIFIED TRIGGER: ICD-10-CM

## 2020-07-07 DIAGNOSIS — Z00.00 WELL WOMAN EXAM (NO GYNECOLOGICAL EXAM): Primary | ICD-10-CM

## 2020-07-07 DIAGNOSIS — R20.2 ARM PARESTHESIA, LEFT: ICD-10-CM

## 2020-07-07 DIAGNOSIS — E55.9 VITAMIN D DEFICIENCY: ICD-10-CM

## 2020-07-07 DIAGNOSIS — J45.909 UNCOMPLICATED ASTHMA, UNSPECIFIED ASTHMA SEVERITY, UNSPECIFIED WHETHER PERSISTENT: ICD-10-CM

## 2020-07-07 RX ORDER — ALBUTEROL SULFATE 90 UG/1
AEROSOL, METERED RESPIRATORY (INHALATION)
Qty: 8.5 INHALER | Refills: 2 | Status: SHIPPED | OUTPATIENT
Start: 2020-07-07 | End: 2021-08-05 | Stop reason: SDUPTHER

## 2020-07-07 NOTE — PROGRESS NOTES
Subjective:   61 y.o. female for Well Woman Check. Her gyne and breast care is done elsewhere by her Ob-Gyne physician. Past medical, Social, and Family history reviewed  Medications reviewed and updated. ROS: Feeling generally well. No TIA's or unusual headaches, no dysphagia. No prolonged cough. No dyspnea or chest pain on exertion. No abdominal pain, change in bowel habits, black or bloody stools. No urinary tract symptoms. No new or unusual musculoskeletal symptoms. Specific concerns today:   Asthma stable  Prn albuterol use 1-2 x per month  Using singulair. OTC flonase controls allergies    GYN does PAP and mammo - UTD from 1/2020 [facility]    Due for colonoscopy this year    Left forearm and hand paresthesias x several weeks  Worse with certain activities, but on occasion has occurred while seated at rest.      Objective: The patient appears well, alert, oriented x 3, in no distress. Visit Vitals  /77 (BP 1 Location: Right arm, BP Patient Position: Sitting)   Pulse 73   Temp 98.6 °F (37 °C) (Oral)   Resp 16   Ht 5' (1.524 m)   Wt 209 lb 6 oz (95 kg)   SpO2 96%   BMI 40.89 kg/m²     ENT normal.  Neck supple. No adenopathy or thyromegaly. DIEGO. Lungs are clear, good air entry, no wheezes, rhonchi or rales. S1 and S2 normal, no murmurs, regular rate and rhythm. Abdomen soft without tenderness, guarding, mass or organomegaly. Extremities show no edema, normal peripheral pulses. Neurological is normal, no focal findings. Breast and Pelvic exams are deferred. Prior labs reviewed. Assessment/Plan:   Well Woman  follow low fat diet, routine labs ordered, call if any problems    ICD-10-CM ICD-9-CM    1. Well woman exam (no gynecological exam) Z00.00 V70.0    2. Encounter for screening colonoscopy Z12.11 V76.51 REFERRAL TO COLON AND RECTAL SURGERY   3. Vitamin D deficiency E55.9 268.9    4.  Allergic rhinitis, unspecified seasonality, unspecified trigger J30.9 477.9 5. Uncomplicated asthma, unspecified asthma severity, unspecified whether persistent J45.909 493.90    6.  Arm paresthesia, left R20.2 782.0 REFERRAL TO NEUROLOGY     Follow-up and Dispositions    · Return in about 1 year (around 7/7/2021), or if symptoms worsen or fail to improve, for wellness visit - IO.        results and schedule of future studies reviewed with patient  reviewed diet, exercise and weight   cardiovascular risk and specific lipid/LDL goals reviewed  reviewed medications and side effects in detail     Obtain mammo report from GYN  Pt to contact colorectal surgeon re: colonoscopy screening  Consider ref PT  Ref to neuro to consider EMG and identify source of neuropathy

## 2020-07-07 NOTE — PROGRESS NOTES
Room 14    Chief Complaint   Patient presents with    Complete Physical       1. Have you been to the ER, urgent care clinic since your last visit? Hospitalized since your last visit? no    2. Have you seen or consulted any other health care providers outside of the [address] [place] [address] since your last visit? Include any pap smears or colon screening. no    Health Maintenance Due   Topic Date Due    Shingrix Vaccine Age 49> (2 of 2) 11/16/2019    Colonoscopy  10/16/2020       3 most recent PHQ Screens 7/7/2020   Little interest or pleasure in doing things Not at all   Feeling down, depressed, irritable, or hopeless Several days   Total Score PHQ 2 1     Learning Assessment 3/30/2018   PRIMARY LEARNER Patient   HIGHEST LEVEL OF EDUCATION - PRIMARY LEARNER  4 YEARS OF COLLEGE   BARRIERS PRIMARY LEARNER NONE     -   CO-LEARNER CAREGIVER No   PRIMARY LANGUAGE ENGLISH   LEARNER PREFERENCE PRIMARY LISTENING     READING   ANSWERED BY patient   RELATIONSHIP SELF     Recent Travel Screening and Travel History documentation     Travel Screening       Question Response     In the last month, have you been in contact with someone who was confirmed or suspected to have Coronavirus / COVID-19? No / Unsure     Do you have any of the following symptoms? None of these     Have you traveled internationally in the last month? No      Travel History   Travel since 06/07/20     No documented travel since 06/07/20                  AVS given to patient and understanding was verbalized.

## 2020-09-08 ENCOUNTER — OFFICE VISIT (OUTPATIENT)
Dept: NEUROLOGY | Facility: CLINIC | Age: 60
End: 2020-09-08
Payer: COMMERCIAL

## 2020-09-08 VITALS
DIASTOLIC BLOOD PRESSURE: 82 MMHG | BODY MASS INDEX: 39.66 KG/M2 | WEIGHT: 202 LBS | HEIGHT: 60 IN | OXYGEN SATURATION: 97 % | HEART RATE: 86 BPM | SYSTOLIC BLOOD PRESSURE: 122 MMHG

## 2020-09-08 DIAGNOSIS — R20.0 NUMBNESS AND TINGLING IN BOTH HANDS: Primary | ICD-10-CM

## 2020-09-08 DIAGNOSIS — R20.2 NUMBNESS AND TINGLING IN BOTH HANDS: Primary | ICD-10-CM

## 2020-09-08 DIAGNOSIS — R09.89 LEFT CAROTID BRUIT: ICD-10-CM

## 2020-09-08 PROCEDURE — 99205 OFFICE O/P NEW HI 60 MIN: CPT | Performed by: PSYCHIATRY & NEUROLOGY

## 2020-09-08 RX ORDER — TRIAMCINOLONE ACETONIDE 1 MG/G
CREAM TOPICAL
COMMUNITY
Start: 2020-08-26 | End: 2020-09-08

## 2020-09-08 NOTE — PROGRESS NOTES
Chief Complaint   Patient presents with    Numbness     in both arms \"I saw my PCP for it in July and it started a few months before that. My PCP referred me to you. \"     Visit Vitals  /82 (BP 1 Location: Right arm, BP Patient Position: Sitting)   Pulse 86   Ht 5' (1.524 m)   Wt 91.6 kg (202 lb)   SpO2 97%   BMI 39.45 kg/m²

## 2020-09-08 NOTE — PROGRESS NOTES
Neurology Consult Note      HISTORY PROVIDED BY: patient    Chief Complaint:   Chief Complaint   Patient presents with    Numbness     in both arms \"I saw my PCP for it in July and it started a few months before that. My PCP referred me to you. \"      Subjective:    Early Certain is a 61 y.o. right handed female who presents in consultation for numbness/tingling in hands. First noticed bilateral hand N/T in  or July while out walking, fingers will swell and would notice tingling. Then started noticing it with holding her phone. Now noticing it in the morning on awakening. N/T is intermittent, in all fingers. Has noticed aching pain up left arm, but had a partial tear in left rotator cuff with dec ROM in that arm. No neck pain. May have weakness, has been dropping things. Had pain in right hand last week, but believes she may have hit it on something. No h/o diabetes, HgbA1C on 20 was only 5.2. LDL was 110, HDL 90. Past Medical History:   Diagnosis Date    Allergic rhinitis     Asthma     Chronic pain     B/L great toe    Colon polyp     Family history of colon cancer     Gout     Hepatitis A     Hx of mammogram 2014    Menopause     Routine gynecological examination     PAP was in 2013    S/P colonoscopy with polypectomy 14    Dr. Denia Naranjo      Past Surgical History:   Procedure Laterality Date    HX  SECTION      HX DILATION AND CURETTAGE        Social History     Socioeconomic History    Marital status:      Spouse name: Not on file    Number of children: Not on file    Years of education: Not on file    Highest education level: Not on file   Occupational History    Occupation: Out of work since COVID-19 pandemic.     Social Needs    Financial resource strain: Not on file    Food insecurity     Worry: Not on file     Inability: Not on file    Transportation needs     Medical: Not on file     Non-medical: Not on file   Tobacco Use    Smoking status: Never Smoker    Smokeless tobacco: Never Used   Substance and Sexual Activity    Alcohol use: Yes     Frequency: 2-4 times a month    Drug use: No    Sexual activity: Yes     Partners: Male   Lifestyle    Physical activity     Days per week: Not on file     Minutes per session: Not on file    Stress: Not on file   Relationships    Social connections     Talks on phone: Not on file     Gets together: Not on file     Attends Yazidism service: Not on file     Active member of club or organization: Not on file     Attends meetings of clubs or organizations: Not on file     Relationship status: Not on file    Intimate partner violence     Fear of current or ex partner: Not on file     Emotionally abused: Not on file     Physically abused: Not on file     Forced sexual activity: Not on file   Other Topics Concern    Not on file   Social History Narrative    Lives in [facility],  and daughter     Family History   Problem Relation Age of Onset    Asthma Mother     Heart Disease Mother     COPD Mother     Emphysema Mother     Other Mother         peripheral vascular disease, Dec 69yo    Diabetes Father     Heart Attack Father         Dec 78yo    Cancer Brother         Rare eye cancer involving the lacrimal sack    No Known Problems Daughter     No Known Problems Son          Objective:   Review of Systems   Constitutional: Negative. HENT: Negative. Eyes: Negative. Respiratory: Negative. Cardiovascular: Negative. Gastrointestinal: Negative. Genitourinary: Negative. Musculoskeletal: Positive for joint pain (hands) and myalgias (Sometimes). Skin: Negative. Neurological: Positive for weakness (sometimes). Endo/Heme/Allergies: Negative. Psychiatric/Behavioral: Negative.         Allergies   Allergen Reactions    Amoxicillin-Pot Clavulanate Rash    Pcn [Penicillins] Rash and Itching        Meds:  Outpatient Medications Prior to Visit   Medication Sig Dispense Refill  albuterol (ProAir HFA) 90 mcg/actuation inhaler INHALE TWO PUFFS BY MOUTH EVERY 4 HOURS AS NEEDED FOR WHEEZING 8.5 Inhaler 2    montelukast (SINGULAIR) 10 mg tablet TAKE ONE TABLET BY MOUTH ONE TIME DAILY 90 Tab 1    BIOTIN PO Take  by mouth.  CYANOCOBALAMIN, VITAMIN B-12, (VITAMIN B-12 PO) Take  by mouth.  LACTOBACILLUS ACIDOPHILUS (PROBIOTIC PO) Take  by mouth.  MULTIVITAMIN PO Take  by mouth.  fluticasone (FLONASE) 50 mcg/actuation nasal spray 2 Sprays by Both Nostrils route daily. 1 Bottle 5    ascorbic acid (VITAMIN C) 500 mg tablet Take  by mouth.  ZINC ACETATE PO Take  by mouth.  Calcium-Cholecalciferol, D3, 600 mg(1,500mg) -400 unit cap Take  by mouth.  diclofenac (VOLTAREN) 1 % topical gel Apply 4 g to affected area four (4) times daily. 100 g 4    cetirizine (ZYRTEC) 5 mg tablet Take  by mouth daily.  inhalational spacing device (MICROCHAMBER) 1 each by Does Not Apply route as needed. 1 Device 1    triamcinolone acetonide (KENALOG) 0.1 % topical cream       azelastine (ASTELIN) 137 mcg (0.1 %) nasal spray 1 [place] by Both Nostrils route two (2) times a day. Use in each nostril as directed 1 Bottle 5     No facility-administered medications prior to visit. Imaging:  MRI Results (most recent):  No results found for this or any previous visit. CT Results (most recent):  No results found for this or any previous visit. Reviewed records in [employer] and [employer] tab today    Lab Review   Results for orders placed or performed in visit on 06/17/20   SARS-COV-2 AB, IGG   Result Value Ref Range    SARS-CoV-2 Ab, IgG Negative Negative        Exam:  Visit Vitals  /82 (BP 1 Location: Right arm, BP Patient Position: Sitting)   Pulse 86   Ht 5' (1.524 m)   Wt 91.6 kg (202 lb)   SpO2 97%   BMI 39.45 kg/m²     General:  Alert, cooperative, no distress. Head:  Normocephalic, without obvious abnormality, atraumatic.    Respiratory:  Heart:   Non labored breathing  Regular rate and rhythm, no murmurs   Neck:   2+ carotids, +left bruits   Extremities: Warm, no cyanosis or edema. Pulses: 2+ radial pulses. Neurologic:  MS: Alert and oriented x 4, speech intact. Language intact. Attention and fund of knowledge appropriate. Recent and remote memory intact. Cranial Nerves:  II: visual fields Full to confrontation   II: pupils Equal, round, reactive to light   II: optic disc    III,VII: ptosis none   III,IV,VI: extraocular muscles  EOMI, no nystagmus or diplopia   V: facial light touch sensation  normal   VII: facial muscle function   symmetric   VIII: hearing intact   IX: soft palate elevation     XI: trapezius strength  5/5   XI: sternocleidomastoid strength 5/5   XII: tongue       Motor: normal bulk and tone, no tremor              Strength: 5/5 throughout, no PD  Sensory: intact to LT, PP except slightly decreased in right 2nd finger compared to left and right forearm compared to the left. Neg Phalen's and Tinel   Coordination: FTN and HTS intact, DARIELA intact  Gait: normal gait, able to tandem walk  Reflexes: 2+ symmetric, toes downgoing           Assessment/Plan   Pt is a 61 y.o. right handed female bilateral hand N/T intermittent since Wend[place] or July, first noticed with hand swelling while walking, then with use of phone, now will wake with numbness. Feels like her hands are weak, may drop things. No neck pain or radicular pain. Exam with left carotid bruit, dec PP in right 2nd finger and forearm compared to left. Given bilateral sxs, cervical etiology is a consideration, but more likely bilateral mononeuropathies. Recommend NCS/EMG to tease out these issues. Pt instructed to call after to discuss next steps. CD ordered to assess for left carotid stenosis. F/u as needed for now. ICD-10-CM ICD-9-CM    1. Numbness and tingling in both hands  R20.0 782. 0 EMG NCV MOTOR WO F/WAVE PER NERVE    R20.2     2.  Left carotid bruit  R09.89 785.9 DUPLEX CAROTID BILATERAL       Signed:   Cheikh Calloway MD  9/8/2020

## 2020-09-08 NOTE — LETTER
9/8/20 Patient: Cara Freitas  
YOB: 1960 Date of Visit: 9/8/2020 Ina Pan MD 
[address] [address] VIA In Basket Dear Ina Pan MD, Thank you for referring Ms. Phoebe Mckenzie to [address] [place] [address] for evaluation. My notes for this consultation are attached. If you have questions, please do not hesitate to call me. I look forward to following your patient along with you. Sincerely, Elisa Flood MD

## 2020-09-08 NOTE — PATIENT INSTRUCTIONS
[facility]   Statement to Patients  April 1, 2014      In an effort to ensure the large volume of patient prescription refills is processed in the most efficient and expeditious manner, we are asking our patients to assist us by calling your Pharmacy for all prescription refills, this will include also your  Mail Order Pharmacy. The pharmacy will contact our office electronically to continue the refill process. Please do not wait until the last minute to call your pharmacy. We need at least 48 hours (2days) to fill prescriptions. We also encourage you to call your pharmacy before going to  your prescription to make sure it is ready. With regard to controlled substance prescription refill requests (narcotic refills) that need to be picked up at our office, we ask your cooperation by providing us with at least 72 hours (3days) notice that you will need a refill. We will not refill narcotic prescription refill requests after 4:00pm on any weekday, Monday through Thursday, or after 2:00pm on Fridays, or on the weekends. We encourage everyone to explore another way of getting your prescription refill request processed using [employer], our patient web portal through our electronic medical record system. [employer] is an efficient and effective way to communicate your medication request directly to the office and  downloadable as an abdirizak on your smart phone . [employer] also features a review functionality that allows you to view your medication list as well as leave messages for your physician. Are you ready to get connected? If so please review the attatched instructions or speak to any of our staff to get you set up right away! Thank you so much for your cooperation. Should you have any questions please contact our Practice Administrator.     The Physicians and Staff,  [facility]

## 2020-09-09 ENCOUNTER — TELEPHONE (OUTPATIENT)
Dept: INTERNAL MEDICINE CLINIC | Age: 60
End: 2020-09-09

## 2020-09-14 ENCOUNTER — HOSPITAL ENCOUNTER (OUTPATIENT)
Dept: VASCULAR SURGERY | Age: 60
Discharge: HOME | End: 2020-09-14
Attending: PSYCHIATRY & NEUROLOGY
Payer: COMMERCIAL

## 2020-09-14 DIAGNOSIS — R09.89 LEFT CAROTID BRUIT: ICD-10-CM

## 2020-09-14 PROCEDURE — 93880 EXTRACRANIAL BILAT STUDY: CPT

## 2020-09-16 LAB
LEFT CCA DIST DIAS: 20.6 CM/S
LEFT CCA DIST SYS: 71.2 CM/S
LEFT CCA PROX DIAS: 20.6 CM/S
LEFT CCA PROX SYS: 66.8 CM/S
LEFT ECA DIAS: 16.2 CM/S
LEFT ECA SYS: 77.3 CM/S
LEFT ICA DIST DIAS: 31.1 CM/S
LEFT ICA DIST SYS: 75.5 CM/S
LEFT ICA MID DIAS: 22.3 CM/S
LEFT ICA MID SYS: 60.7 CM/S
LEFT ICA PROX DIAS: 15.4 CM/S
LEFT ICA PROX SYS: 39.8 CM/S
LEFT ICA/CCA SYS: 1.1
LEFT VERTEBRAL DIAS: 9.9 CM/S
LEFT VERTEBRAL SYS: 34.6 CM/S
RIGHT CCA DIST DIAS: 24.2 CM/S
RIGHT CCA DIST SYS: 73.8 CM/S
RIGHT CCA PROX DIAS: 20.3 CM/S
RIGHT CCA PROX SYS: 92 CM/S
RIGHT ECA DIAS: 19 CM/S
RIGHT ECA SYS: 89.4 CM/S
RIGHT ICA DIST DIAS: 24.2 CM/S
RIGHT ICA DIST SYS: 54.2 CM/S
RIGHT ICA MID DIAS: 32 CM/S
RIGHT ICA MID SYS: 85.5 CM/S
RIGHT ICA PROX DIAS: 22.9 CM/S
RIGHT ICA PROX SYS: 68.5 CM/S
RIGHT ICA/CCA SYS: 1.2
RIGHT VERTEBRAL DIAS: 18.8 CM/S
RIGHT VERTEBRAL SYS: 40.7 CM/S

## 2020-09-17 ENCOUNTER — OFFICE VISIT (OUTPATIENT)
Dept: NEUROLOGY | Facility: CLINIC | Age: 60
End: 2020-09-17

## 2020-09-17 VITALS
HEART RATE: 78 BPM | SYSTOLIC BLOOD PRESSURE: 120 MMHG | OXYGEN SATURATION: 98 % | RESPIRATION RATE: 18 BRPM | WEIGHT: 200 LBS | BODY MASS INDEX: 39.27 KG/M2 | HEIGHT: 60 IN | DIASTOLIC BLOOD PRESSURE: 80 MMHG

## 2020-09-17 DIAGNOSIS — R20.2 NUMBNESS AND TINGLING IN BOTH HANDS: Primary | ICD-10-CM

## 2020-09-17 DIAGNOSIS — R20.0 NUMBNESS AND TINGLING IN BOTH HANDS: Primary | ICD-10-CM

## 2020-09-17 NOTE — PROGRESS NOTES
[address] [facility]  [address] [place] [address], [address] [place] [address]  Phone [phone] Fax [phone]  Test Date:  2020    Patient: Kt Mathew : 1960 Physician: Saira Arzate MD   Sex: Female Height: 5' 0\" Ref Phys: Savanna Rios MD   ID#: [id number] Weight: 202 lbs. Technician: Randy Carrillo. .EMG TECH     Patient Complaints:  BUE    Patient History / Exam:  20-year-old female has been evaluated for numbness in both hands. NCV & EMG Findings:  Evaluation of the left median sensory nerve showed prolonged distal peak latency (3.9 ms) and decreased conduction velocity (Wrist-2nd Digit, 36 m/s). The left median/ulnar (palm) comparison and the right median/ulnar (palm) comparison nerves showed abnormal peak latency difference (Median Palm-Ulnar Palm, L1.1, R1.1 ms). All remaining nerves  were within normal limits. All F Wave latencies were within normal limits. All examined muscles (as indicated in the following table) showed no evidence of electrical instability. Impression:  The electrodiagnostic testing is suggestive of mild entrapment median mononeuropathy i.e. carpal tunnel syndrome, left slightly worse than right.   There is no evidence of cervical radiculopathy on either side    Recommendations:  Wrist braces  If symptoms do not improve or worsen, consider referral to hand surgery    ___________________________  Saira Arzate MD        Nerve Conduction Studies  Anti Sensory Summary Table     Stim Site NR Peak (ms) Norm Peak (ms) P-T Amp (µV) Norm P-T Amp Onset (ms) Site1 Site2 Delta-P (ms) Dist (cm) Bib (m/s) Norm Ibb (m/s)   Left Median Anti Sensory (2nd Digit)  30.3°C   Wrist    3.9 <3.6 31.3 >10 3.0 Wrist 2nd Digit 3.9 14.0 36 >39   Right Median Anti Sensory (2nd Digit)  31.8°C   Wrist    3.4 <3.6 22.4 >10 2.8 Wrist 2nd Digit 3.4 14.0 41 >39   Left Radial Anti Sensory (Base 1st Digit)  31.8°C   Wrist    1.8 <3.1 37.8  1.3 Wrist Base 1st Digit 1.8 10.0 56    Left Ulnar Anti Sensory (5th Digit)  30.3°C   Wrist    3.0 <3.7 27.2 >15.0 2.2 Wrist 5th Digit 3.0 14.0 47 >38     Motor Summary Table     Stim Site NR Onset (ms) Norm Onset (ms) O-P Amp (mV) Norm O-P Amp Site1 Site2 Delta-0 (ms) Dist (cm) Bib (m/s) Norm Bib (m/s)   Left Median Motor (Abd Poll Brev)  30.2°C   Wrist    4.0 <4.2 7.3 >5 Elbow Wrist 3.1 17.0 55 >50   Elbow    7.1  6.5          Right Median Motor (Abd Poll Brev)  31.7°C   Wrist    4.1 <4.2 5.8 >5 Elbow Wrist 2.9 18.0 62 >50   Elbow    7.0  5.0          Left Ulnar Motor (Abd Dig Minimi)  31.2°C   Wrist    2.5 <4.2 12.0 >3 B Elbow Wrist 2.5 16.0 64 >53   B Elbow    5.0  11.8  A Elbow B Elbow 1.6 10.0 63 >53   A Elbow    6.6  11.2            Comparison Summary Table     Stim Site NR Peak (ms) Norm Peak (ms) P-T Amp (µV) Site1 Site2 Delta-P (ms) Norm Delta (ms)   Left Median/Ulnar Palm Comparison (Wrist - 8cm)  30.2°C   Median Palm    2.4 <2.5 48.9 Median Palm Ulnar Palm 1.1 <0.3   Ulnar Palm    1.3 <2.5 20.9       Right Median/Ulnar Palm Comparison (Wrist - 8cm)  31.8°C   Median Palm    2.1 <2.5 25.9 Median Palm Ulnar Palm 1.1 <0.3   Ulnar Palm    1.0 <2.5 21.5         F Wave Studies     NR F-Lat (ms) Lat Norm (ms) L-R F-Lat (ms) L-R Lat Norm   Left Ulnar (Mrkrs) (Abd Dig Min)  31.4°C      22.76 <36  <2.5     EMG     Side Muscle Nerve Root Ins Act Fibs Psw Amp Dur Poly Recrt Int Pat Comment   Left Abd Poll Brev Median C8-T1 Nml Nml Nml Nml Nml 0 Nml Nml    Left 1stDorInt Ulnar C8-T1 Nml Nml Nml Nml Nml 0 Nml Nml    Left BrachioRad Radial C5-6 Nml Nml Nml Nml Nml 0 Nml Nml    Left PronatorTeres Median C6-7 Nml Nml Nml Nml Nml 0 Nml Nml    Left Triceps Radial C6-7-8 Nml Nml Nml Nml Nml 0 Nml Nml    Left Deltoid Axillary C5-6 Nml Nml Nml Nml Nml 0 Nml Nml    Right Abd Poll Brev Median C8-T1 Nml Nml Nml Nml Nml 0 Nml Nml    Right 1stDorInt Ulnar C8-T1 Nml Nml Nml Nml Nml 0 Nml Nml    Right BrachioRad Radial C5-6 Nml Nml Nml Nml Nml 0 Nml Nml    Right PronatorTeres Median C6-7 Nml Nml Nml Nml Nml 0 Nml Nml    Right Triceps Radial C6-7-8 Nml Nml Nml Nml Nml 0 Nml Nml    Right Deltoid Axillary C5-6 Nml Nml Nml Nml Nml 0 Nml Nml          Waveforms:

## 2020-09-30 NOTE — TELEPHONE ENCOUNTER
Medication refill authorized. Topical Sulfur Applications Pregnancy And Lactation Text: This medication is Pregnancy Category C and has an unknown safety profile during pregnancy. It is unknown if this topical medication is excreted in breast milk.

## 2020-10-12 RX ORDER — MONTELUKAST SODIUM 10 MG/1
10 TABLET ORAL DAILY
Qty: 90 TAB | Refills: 1 | Status: SHIPPED | OUTPATIENT
Start: 2020-10-12 | End: 2021-05-04

## 2021-05-04 RX ORDER — MONTELUKAST SODIUM 10 MG/1
TABLET ORAL
Qty: 90 TAB | Refills: 1 | Status: SHIPPED | OUTPATIENT
Start: 2021-05-04 | End: 2021-08-05 | Stop reason: SDUPTHER

## 2021-07-22 ENCOUNTER — APPOINTMENT (OUTPATIENT)
Dept: INTERNAL MEDICINE CLINIC | Age: 61
End: 2021-07-22

## 2021-07-22 DIAGNOSIS — Z00.00 WELL WOMAN EXAM (NO GYNECOLOGICAL EXAM): ICD-10-CM

## 2021-07-22 DIAGNOSIS — R73.09 ELEVATED GLUCOSE: ICD-10-CM

## 2021-07-22 DIAGNOSIS — Z13.220 SCREENING FOR LIPID DISORDERS: ICD-10-CM

## 2021-07-22 DIAGNOSIS — E55.9 VITAMIN D DEFICIENCY: ICD-10-CM

## 2021-07-22 LAB
25(OH)D3 SERPL-MCNC: 53.5 NG/ML (ref 30–100)
ALBUMIN SERPL-MCNC: 3.8 G/DL (ref 3.5–5)
ALBUMIN/GLOB SERPL: 1.2 {RATIO} (ref 1.1–2.2)
ALP SERPL-CCNC: 89 U/L (ref 45–117)
ALT SERPL-CCNC: 34 U/L (ref 12–78)
ANION GAP SERPL CALC-SCNC: 6 MMOL/L (ref 5–15)
AST SERPL-CCNC: 23 U/L (ref 15–37)
BASOPHILS # BLD: 0.1 K/UL (ref 0–0.1)
BASOPHILS NFR BLD: 1 % (ref 0–1)
BILIRUB SERPL-MCNC: 0.4 MG/DL (ref 0.2–1)
BUN SERPL-MCNC: 18 MG/DL (ref 6–20)
BUN/CREAT SERPL: 25 (ref 12–20)
CALCIUM SERPL-MCNC: 9.2 MG/DL (ref 8.5–10.1)
CHLORIDE SERPL-SCNC: 108 MMOL/L (ref 97–108)
CHOLEST SERPL-MCNC: 239 MG/DL
CO2 SERPL-SCNC: 28 MMOL/L (ref 21–32)
CREAT SERPL-MCNC: 0.71 MG/DL (ref 0.55–1.02)
DIFFERENTIAL METHOD BLD: ABNORMAL
EOSINOPHIL # BLD: 0.7 K/UL (ref 0–0.4)
EOSINOPHIL NFR BLD: 11 % (ref 0–7)
ERYTHROCYTE [DISTWIDTH] IN BLOOD BY AUTOMATED COUNT: 12.3 % (ref 11.5–14.5)
EST. AVERAGE GLUCOSE BLD GHB EST-MCNC: 97 MG/DL
GLOBULIN SER CALC-MCNC: 3.2 G/DL (ref 2–4)
GLUCOSE SERPL-MCNC: 80 MG/DL (ref 65–100)
HBA1C MFR BLD: 5 % (ref 4–5.6)
HCT VFR BLD AUTO: 44.4 % (ref 35–47)
HDLC SERPL-MCNC: 92 MG/DL
HDLC SERPL: 2.6 {RATIO} (ref 0–5)
HGB BLD-MCNC: 14.6 G/DL (ref 11.5–16)
IMM GRANULOCYTES # BLD AUTO: 0 K/UL (ref 0–0.04)
IMM GRANULOCYTES NFR BLD AUTO: 0 % (ref 0–0.5)
LDLC SERPL CALC-MCNC: 130 MG/DL (ref 0–100)
LYMPHOCYTES # BLD: 2.1 K/UL (ref 0.8–3.5)
LYMPHOCYTES NFR BLD: 32 % (ref 12–49)
MCH RBC QN AUTO: 32.7 PG (ref 26–34)
MCHC RBC AUTO-ENTMCNC: 32.9 G/DL (ref 30–36.5)
MCV RBC AUTO: 99.3 FL (ref 80–99)
MONOCYTES # BLD: 0.7 K/UL (ref 0–1)
MONOCYTES NFR BLD: 11 % (ref 5–13)
NEUTS SEG # BLD: 3.1 K/UL (ref 1.8–8)
NEUTS SEG NFR BLD: 45 % (ref 32–75)
NRBC # BLD: 0 K/UL (ref 0–0.01)
NRBC BLD-RTO: 0 PER 100 WBC
PLATELET # BLD AUTO: 232 K/UL (ref 150–400)
PMV BLD AUTO: 12 FL (ref 8.9–12.9)
POTASSIUM SERPL-SCNC: 4.2 MMOL/L (ref 3.5–5.1)
PROT SERPL-MCNC: 7 G/DL (ref 6.4–8.2)
RBC # BLD AUTO: 4.47 M/UL (ref 3.8–5.2)
SODIUM SERPL-SCNC: 142 MMOL/L (ref 136–145)
TRIGL SERPL-MCNC: 85 MG/DL (ref ?–150)
VLDLC SERPL CALC-MCNC: 17 MG/DL
WBC # BLD AUTO: 6.7 K/UL (ref 3.6–11)

## 2021-08-05 ENCOUNTER — TELEPHONE (OUTPATIENT)
Dept: NEUROLOGY | Age: 61
End: 2021-08-05

## 2021-08-05 ENCOUNTER — OFFICE VISIT (OUTPATIENT)
Dept: INTERNAL MEDICINE CLINIC | Age: 61
End: 2021-08-05
Payer: COMMERCIAL

## 2021-08-05 ENCOUNTER — TELEPHONE (OUTPATIENT)
Dept: INTERNAL MEDICINE CLINIC | Age: 61
End: 2021-08-05

## 2021-08-05 VITALS
TEMPERATURE: 98.9 F | HEIGHT: 60 IN | OXYGEN SATURATION: 95 % | DIASTOLIC BLOOD PRESSURE: 77 MMHG | HEART RATE: 69 BPM | WEIGHT: 184.4 LBS | SYSTOLIC BLOOD PRESSURE: 119 MMHG | BODY MASS INDEX: 36.2 KG/M2

## 2021-08-05 DIAGNOSIS — J30.9 ALLERGIC RHINITIS, UNSPECIFIED SEASONALITY, UNSPECIFIED TRIGGER: ICD-10-CM

## 2021-08-05 DIAGNOSIS — J45.909 UNCOMPLICATED ASTHMA, UNSPECIFIED ASTHMA SEVERITY, UNSPECIFIED WHETHER PERSISTENT: ICD-10-CM

## 2021-08-05 DIAGNOSIS — Z00.00 WELL WOMAN EXAM (NO GYNECOLOGICAL EXAM): Primary | ICD-10-CM

## 2021-08-05 DIAGNOSIS — E55.9 VITAMIN D DEFICIENCY: ICD-10-CM

## 2021-08-05 PROCEDURE — 99396 PREV VISIT EST AGE 40-64: CPT | Performed by: INTERNAL MEDICINE

## 2021-08-05 RX ORDER — ALBUTEROL SULFATE 90 UG/1
AEROSOL, METERED RESPIRATORY (INHALATION)
Qty: 8.5 INHALER | Refills: 2 | Status: SHIPPED | OUTPATIENT
Start: 2021-08-05

## 2021-08-05 RX ORDER — MONTELUKAST SODIUM 10 MG/1
TABLET ORAL
Qty: 90 TABLET | Refills: 3 | Status: SHIPPED | OUTPATIENT
Start: 2021-08-05

## 2021-08-05 NOTE — TELEPHONE ENCOUNTER
----- Message from Daisy Barahona MD sent at 8/5/2021  2:05 PM EDT -----  As per PCPs request, please inform that EMG study that we did on her last year showed mild carpal tunnel syndrome on both sides. If the symptoms have not improved or worsened, she should see a hand surgeon. She can let us know if there are any questions.

## 2021-08-05 NOTE — LETTER
Name: Ihsan Jose   Sex: female   : 1960   [address]  [phone] (home)     Current Immunizations:  Immunization History   Administered Date(s) Administered    Influenza Vaccine 10/11/2014, 10/16/2016, 10/18/2017, 10/02/2018, 2019    Influenza Vaccine (>6 mo Afluria QUAD Vial 81377 (0.25 mL) / 00323 (0.5 mL)) 10/31/2019    Influenza Vaccine (Quad) Mdck Pf (>4 Yrs Flucelvax QUAD 92646) 2019    Influenza Vaccine (Quad) PF (>6 Mo Flulaval, Fluarix, and >3 Yrs Afluria, Fluzone 70274) 2015    Pneumococcal Polysaccharide (PPSV-23) 2012    Tdap 10/08/2014    Zoster Recombinant 2019, 2020    Zoster Vaccine, Live 10/11/2014       Allergies:   Allergies as of 2021 - Fully Reviewed 2021   Allergen Reaction Noted    Amoxicillin-pot clavulanate Rash 2005    Pcn [penicillins] Rash and Itching 10/08/2014

## 2021-08-05 NOTE — TELEPHONE ENCOUNTER
Request for colonoscopy report has been faxed to Dr[facility]. Fax [phone]. transmission log received and placed in scanning.

## 2021-08-05 NOTE — PROGRESS NOTES
Subjective:   61 y.o. female for Well Woman Check. Her gyne and breast care is done elsewhere by her Ob-Gyne physician. Past medical, Social, and Family history reviewed  Medications reviewed and updated. ROS: Feeling generally well. No TIA's or unusual headaches, no dysphagia. No prolonged cough. No dyspnea or chest pain on exertion. No abdominal pain, change in bowel habits, black or bloody stools. No urinary tract symptoms. No new or unusual musculoskeletal symptoms. Specific concerns today:     asthma stable  Rare albuterol. No new complaints      Objective: The patient appears well, alert, oriented x 3, in no distress. Visit Vitals  /77   Pulse 69   Temp 98.9 °F (37.2 °C)   Ht 5' (1.524 m)   Wt 184 lb 6.4 oz (83.6 kg)   SpO2 95%   BMI 36.01 kg/m²     ENT normal.  Neck supple. No adenopathy or thyromegaly. DIEGO. Lungs are clear, good air entry, no wheezes, rhonchi or rales. S1 and S2 normal, no murmurs, regular rate and rhythm. Abdomen soft without tenderness, guarding, mass or organomegaly. Extremities show no edema, normal peripheral pulses. Neurological is normal, no focal findings. Breast and Pelvic exams are deferred. Prior labs reviewed. Reviewed prior imaging reports      Assessment/Plan:   Well Woman  follow low fat diet, routine labs ordered, call if any problems    ICD-10-CM ICD-9-CM    1. Well woman exam (no gynecological exam)  Z00.00 V70.0     [V70.0]   2. Vitamin D deficiency  E55.9 268.9    3. Allergic rhinitis, unspecified seasonality, unspecified trigger  J30.9 477.9    4. Uncomplicated asthma, unspecified asthma severity, unspecified whether persistent  J45.909 493.90      Follow-up and Dispositions    · Return in about 1 year (around 8/5/2022), or if symptoms worsen or fail to improve, for wellness visit.         results and schedule of future studies reviewed with patient  reviewed diet, exercise and weight   cardiovascular risk and specific lipid/LDL goals reviewed  reviewed medications and side effects in detail     Strongly encourage COVID vaccine  Obtain colonoscopy results - pt had done around 10/2020

## 2021-08-05 NOTE — PROGRESS NOTES
RM 14    Chief Complaint   Patient presents with    Complete Physical       Visit Vitals  /77   Pulse 69   Temp 98.9 °F (37.2 °C)   Ht 5' (1.524 m)   Wt 184 lb 6.4 oz (83.6 kg)   SpO2 95%   BMI 36.01 kg/m²       3 most recent PHQ Screens 8/5/2021   Little interest or pleasure in doing things Not at all   Feeling down, depressed, irritable, or hopeless Not at all   Total Score PHQ 2 0         1. Have you been to the ER, urgent care clinic since your last visit? Hospitalized since your last visit? No    2. Have you seen or consulted any other health care providers outside of the [address] [place] [address] since your last visit? Include any pap smears or colon screening. [facility], had bone density scan and mammogram 2/2021    Health Maintenance Due   Topic Date Due    COVID-19 Vaccine (1) Never done    Colorectal Cancer Screening Combo  10/16/2020       Learning Assessment 3/30/2018   PRIMARY LEARNER Patient   HIGHEST LEVEL OF EDUCATION - PRIMARY LEARNER  4 YEARS OF COLLEGE   BARRIERS PRIMARY LEARNER NONE     -   CO-LEARNER CAREGIVER No   PRIMARY LANGUAGE ENGLISH   LEARNER PREFERENCE PRIMARY LISTENING     READING   ANSWERED BY patient   RELATIONSHIP SELF     AVS  education, follow up, and recommendations provided and addressed with patient.

## 2022-03-20 PROBLEM — E66.01 OBESITY, MORBID (HCC): Status: ACTIVE | Noted: 2018-03-30

## 2023-05-18 RX ORDER — MONTELUKAST SODIUM 10 MG/1
1 TABLET ORAL DAILY
COMMUNITY
Start: 2021-08-05

## 2023-05-18 RX ORDER — ASCORBIC ACID 500 MG
TABLET ORAL
COMMUNITY

## 2023-05-18 RX ORDER — FLUTICASONE PROPIONATE 50 MCG
2 SPRAY, SUSPENSION (ML) NASAL DAILY
COMMUNITY
Start: 2018-03-30

## 2023-05-18 RX ORDER — CETIRIZINE HYDROCHLORIDE 5 MG/1
TABLET ORAL DAILY
COMMUNITY

## 2023-05-18 RX ORDER — ALBUTEROL SULFATE 90 UG/1
AEROSOL, METERED RESPIRATORY (INHALATION)
COMMUNITY
Start: 2021-08-05